# Patient Record
Sex: FEMALE | Race: OTHER
[De-identification: names, ages, dates, MRNs, and addresses within clinical notes are randomized per-mention and may not be internally consistent; named-entity substitution may affect disease eponyms.]

---

## 2017-07-11 ENCOUNTER — HOSPITAL ENCOUNTER (EMERGENCY)
Dept: HOSPITAL 62 - ER | Age: 42
Discharge: HOME | End: 2017-07-11
Payer: COMMERCIAL

## 2017-07-11 VITALS — DIASTOLIC BLOOD PRESSURE: 79 MMHG | SYSTOLIC BLOOD PRESSURE: 136 MMHG
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
## 2017-07-11 DIAGNOSIS — R11.0: ICD-10-CM

## 2017-07-11 DIAGNOSIS — R42: ICD-10-CM

## 2017-07-11 DIAGNOSIS — S09.90XA: Primary | ICD-10-CM

## 2017-07-11 DIAGNOSIS — M62.838: ICD-10-CM

## 2017-07-11 DIAGNOSIS — S43.402A: ICD-10-CM
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
## 2017-07-11 PROCEDURE — 73030 X-RAY EXAM OF SHOULDER: CPT

## 2017-07-11 PROCEDURE — S0119 ONDANSETRON 4 MG: HCPCS

## 2017-07-11 PROCEDURE — 99284 EMERGENCY DEPT VISIT MOD MDM: CPT

## 2017-07-11 PROCEDURE — 70450 CT HEAD/BRAIN W/O DYE: CPT

## 2017-07-11 NOTE — RADIOLOGY REPORT (SQ)
EXAM DESCRIPTION:  CT HEAD WITHOUT



COMPLETED DATE/TIME:  7/11/2017 3:41 pm



REASON FOR STUDY:  fall, head injury



COMPARISON:  None.



TECHNIQUE:  Axial images acquired through the brain without intravenous contrast.  Images reviewed wi
th bone, brain and subdural windows.  Images stored on PACS.

All CT scanners at this facility use dose modulation, iterative reconstruction, and/or weight based d
osing when appropriate to reduce radiation dose to as low as reasonably achievable (ALARA).

[facility]: Dose Right  [facility]: CareDose    [facility]: Dose Right    [facility]: Teradose 4D    [facility]: Smart PrivacyStar



RADIATION DOSE:   mGy.



LIMITATIONS:  None.



FINDINGS:  VENTRICLES: Normal size and contour.

CEREBRUM: No masses.  No hemorrhage.  No midline shift.  Normal gray/white matter differentiation.  N
o evidence for acute infarction.

CEREBELLUM: No masses.  No hemorrhage.  No alteration of density.  No evidence for acute infarction.

EXTRAAXIAL SPACES: No fluid collections.  No masses.

ORBITS AND GLOBE: No intra- or extraconal masses.  Normal contour of globe without masses.

CALVARIUM: No fracture.

PARANASAL SINUSES: No fluid or mucosal thickening.

SOFT TISSUES: No mass or hematoma.

OTHER: No other significant finding.



IMPRESSION:  NORMAL BRAIN CT WITHOUT CONTRAST.



TECHNICAL DOCUMENTATION:  JOB ID:  [id number]

Quality ID # [id number]: Final reports with documentation of one or more dose reduction techniques (e.g., Au
tomated exposure control, adjustment of the mA and/or kV according to patient size, use of iterative 
reconstruction technique)

 2011 [employer]- All Rights Reserved

## 2017-07-11 NOTE — RADIOLOGY REPORT (SQ)
EXAM DESCRIPTION:  SHOULDER LEFT 2 OR MORE VIEWS



COMPLETED DATE/TIME:  7/11/2017 4:05 pm



REASON FOR STUDY:  fall, shoulder pain



COMPARISON:  None.



NUMBER OF VIEWS:  Three views.



TECHNIQUE:  Internal rotation, external rotation, and Y view images acquired of the left shoulder.



LIMITATIONS:  None.



FINDINGS:  MINERALIZATION: Normal.

BONES: No acute fracture or dislocation.  No worrisome bone lesions.

JOINTS: No dislocation.

VISUALIZED LUNGS AND RIBS: No pneumothorax.  No rib fracture.

SOFT TISSUES: No radiopaque foreign body.

OTHER: No other significant finding.



IMPRESSION:  NEGATIVE STUDY OF THE LEFT SHOULDER. NO RADIOGRAPHIC EVIDENCE OF ACUTE INJURY.



TECHNICAL DOCUMENTATION:  JOB ID:  [id number]

 2011 [employer]- All Rights Reserved

## 2017-07-11 NOTE — ER DOCUMENT REPORT
HPI





- HPI


Patient complains to provider of: fall


Onset: This afternoon


Onset/Duration: Sudden


Quality of pain: Sharp


Pain Level: 5


Context: 


States that she was cleaning toddler playground equipment.  Patient states that 

she fell on a toddler climbing apparatus that was 2 feet tall and fell landing 

hitting her left shoulder.  Patient complains of headache, left lateral neck 

pain, left shoulder pain and nausea.  Patient states nausea has gradually 

started to improve.  Patient also reports she initially had dizziness that has 

gotten better.  Patient denies any loss of consciousness.


Associated Symptoms: Headache, Nausea, Other - shoulder pain.  denies: Vomiting


Exacerbated by: Movement


Relieved by: Denies


Similar symptoms previously: No


Recently seen / treated by doctor: No





- ROS


ROS below otherwise negative: Yes


Systems Reviewed and Negative: Yes All other systems reviewed and negative





- NEURO


Neurology: REPORTS: Headache, Dizzinesss / Vertigo.  DENIES: Weakness





- CARDIOVASCULAR


Cardiovascular: DENIES: Chest pain





- GASTROINTESTINAL


Gastrointestinal: REPORTS: Nausea.  DENIES: Patient vomiting





- MUSCULOSKELETAL


Musculoskeletal: REPORTS: Extremity pain, Back Pain, Neck Pain





- DERM


Skin Color: Normal, Pink


Skin Problems: None





Past Medical History





- General


Information source: Patient





- Social History


Smoking Status: Never Smoker


Chew tobacco use (# tins/day): No


Frequency of alcohol use: None


Drug Abuse: None


Occupation: day care


Family History: Reviewed & Not Pertinent


Patient has suicidal ideation: No


Patient has homicidal ideation: No





- Medical History


Medical History: Negative





- Past Medical History


Cardiac Medical History: 


   Denies: Hx Heart Attack, Hx Hypertension


Pulmonary Medical History: 


   Denies: Hx Asthma


Neurological Medical History: Denies: Hx Cerebrovascular Accident, Hx Seizures


Renal/ Medical History: Denies: Hx Peritoneal Dialysis


GI Medical History: Denies: Hx Hepatitis, Hx Hiatal Hernia, Hx Ulcer


Infectious Medical History: Denies: Hx Hepatitis


Surgical Hx: Negative


Past Surgical History: Denies: Hx Hysterectomy, Hx Mastectomy, Hx Open Heart 

Surgery, Hx Pacemaker





Vertical Provider Document





- CONSTITUTIONAL


Agree With Documented VS: Yes


Exam Limitations: No Limitations


General Appearance: WD/WN, No Apparent Distress





- INFECTION CONTROL


TRAVEL OUTSIDE OF THE [place] IN LAST 30 DAYS: No





- HEENT


HEENT: Atraumatic, Normal ENT Exam, Normocephalic, PERRLA


Notes: 


no hemotympanum, no fluid or drainage from ears or nose bilaterally. no scalp 

hematoma, no ecchymosis or swelling





- NECK


Neck: Normal Inspection, Supple.  negative: Lymphadenopathy-Left, 

Lymphadenopathy-Right


Notes: 


no midline tenderness, step-off or deformity





- RESPIRATORY


Respiratory: Breath Sounds Normal, No Respiratory Distress, Chest Non-Tender


O2 Sat by Pulse Oximetry: 100





- CARDIOVASCULAR


Cardiovascular: Regular Rate, Regular Rhythm, No Murmur


Pulses: Normal: Radial





- BACK


Back: Abnormal Inspection - Trapezius muscle tenderness


Notes: 


no spinal midline tenderness, step-off or deformity





- MUSCULOSKELETAL/EXTREMETIES


Musculoskeletal/Extremeties: MAEW, FROM, Tender - Left posterior shoulder joint 

tenderness, no deformity or dislocation, No Edema





- NEURO


Level of Consciousness: Awake, Alert, Appropriate


Motor/Sensory: No Motor Deficit





- DERM


Integumentary: Warm, Dry, No Rash





Course





- Vital Signs


Vital signs: 


 











Temp Pulse Resp BP Pulse Ox


 


 98.2 F   58 L  16   127/97 H  100 


 


 07/11/17 14:01  07/11/17 14:01  07/11/17 14:01  07/11/17 14:01  07/11/17 14:01














- Diagnostic Test


Radiology reviewed: Reports reviewed





Procedures





- Immobilization


  ** Left Shoulder


Pre-Proc Neuro Vasc Exam: Normal


Immobilizer type: Sling


Performed by: PCT


Post-Proc Neuro Vasc Exam: Normal


Alignment checked and good: Yes





Discharge





- Discharge


Clinical Impression: 


 Trapezius muscle spasm





Fall


Qualifiers:


 Encounter type: initial encounter Qualified Code(s): W19.XXXA - Unspecified 

fall, initial encounter





Head injury


Qualifiers:


 Encounter type: initial encounter Qualified Code(s): S09.90XA - Unspecified 

injury of head, initial encounter





Sprain of left shoulder


Qualifiers:


 Encounter type: initial encounter Shoulder sprain type: unspecified sprain 

Qualified Code(s): S43.402A - Unspecified sprain of left shoulder joint, 

initial encounter





Condition: Stable


Disposition: HOME, SELF-CARE


Instructions:  Head Injury Precautions ([facility]), Muscle Strain ([facility]), Muscle 

Relaxers ([facility]), Shoulder Injury ([facility]), Temporary Sling ([facility])


Additional Instructions: 


Return immediately for any new or worsening symptoms





Followup with your primary care provider, call tomorrow to make a followup 

appointment





Follow-up with orthopedic doctor for any continued pain or problems


Prescriptions: 


Cyclobenzaprine HCl [Flexeril 10 Mg Tablet] 10 mg PO TID #15 tablet


Forms:  Return to Work


Referrals: 


BEVERLY STANFORD PA-C [Primary Care Provider] - Follow up tomorrow


[facility] [Provider Group] - Follow up as needed

## 2018-12-31 ENCOUNTER — HOSPITAL ENCOUNTER (OUTPATIENT)
Dept: HOSPITAL 62 - ER | Age: 43
Setting detail: OBSERVATION
LOS: 1 days | Discharge: HOME | End: 2019-01-01
Attending: OBSTETRICS & GYNECOLOGY | Admitting: OBSTETRICS & GYNECOLOGY
Payer: COMMERCIAL

## 2018-12-31 DIAGNOSIS — K65.9: ICD-10-CM

## 2018-12-31 DIAGNOSIS — O00.90: Primary | ICD-10-CM

## 2018-12-31 DIAGNOSIS — Z98.51: ICD-10-CM

## 2018-12-31 DIAGNOSIS — K66.1: ICD-10-CM

## 2018-12-31 DIAGNOSIS — N70.11: ICD-10-CM

## 2018-12-31 DIAGNOSIS — N83.201: ICD-10-CM

## 2018-12-31 LAB
ABSOLUTE LYMPHOCYTES# (MANUAL): 0.2 10^3/UL (ref 0.5–4.7)
ABSOLUTE MONOCYTES # (MANUAL): 1.7 10^3/UL (ref 0.1–1.4)
ABSOLUTE NEUTROPHILS# (MANUAL): 21.8 10^3/UL (ref 1.7–8.2)
ADD MANUAL DIFF: YES
ANION GAP SERPL CALC-SCNC: 12 MMOL/L (ref 5–19)
APPEARANCE UR: (no result)
APTT PPP: (no result) S
BACTERIA (WET MOUNT): (no result)
BASOPHILS NFR BLD MANUAL: 0 % (ref 0–2)
BILIRUB UR QL STRIP: (no result)
BUN SERPL-MCNC: 10 MG/DL (ref 7–20)
CALCIUM: 9.4 MG/DL (ref 8.4–10.2)
CHLAM PCR: NOT DETECTED
CHLORIDE SERPL-SCNC: 100 MMOL/L (ref 98–107)
CO2 SERPL-SCNC: 25 MMOL/L (ref 22–30)
EOSINOPHIL NFR BLD MANUAL: 0 % (ref 0–6)
ERYTHROCYTE [DISTWIDTH] IN BLOOD BY AUTOMATED COUNT: 12.3 % (ref 11.5–14)
GLUCOSE SERPL-MCNC: 170 MG/DL (ref 75–110)
GLUCOSE UR STRIP-MCNC: 50 MG/DL
GON PCR: NOT DETECTED
HCT VFR BLD CALC: 38.2 % (ref 36–47)
HGB BLD-MCNC: 13.1 G/DL (ref 12–15.5)
KETONES UR STRIP-MCNC: (no result) MG/DL
MCH RBC QN AUTO: 29.6 PG (ref 27–33.4)
MCHC RBC AUTO-ENTMCNC: 34.3 G/DL (ref 32–36)
MCV RBC AUTO: 86 FL (ref 80–97)
MONOCYTES % (MANUAL): 7 % (ref 3–13)
NITRITE UR QL STRIP: NEGATIVE
PH UR STRIP: 5 [PH] (ref 5–9)
PLATELET # BLD: 328 10^3/UL (ref 150–450)
PLATELET COMMENT: ADEQUATE
POLYCHROMASIA BLD QL SMEAR: SLIGHT
POTASSIUM SERPL-SCNC: 3.8 MMOL/L (ref 3.6–5)
PROT UR STRIP-MCNC: 30 MG/DL
RBC # BLD AUTO: 4.42 10^6/UL (ref 3.72–5.28)
SEGMENTED NEUTROPHILS % (MAN): 92 % (ref 42–78)
SODIUM SERPL-SCNC: 136.5 MMOL/L (ref 137–145)
SP GR UR STRIP: 1.03
T.VAGINALIS (WET MOUNT): (no result)
TOTAL CELLS COUNTED BLD: 100
TOXIC GRANULES BLD QL SMEAR: SLIGHT
UROBILINOGEN UR-MCNC: 4 MG/DL (ref ?–2)
VARIANT LYMPHS NFR BLD MANUAL: 1 % (ref 13–45)
WBC # BLD AUTO: 23.7 10^3/UL (ref 4–10.5)
WBCS (WET MOUNT): (no result)
YEAST (WET MOUNT): (no result)

## 2018-12-31 PROCEDURE — 93976 VASCULAR STUDY: CPT

## 2018-12-31 PROCEDURE — 59151 TREAT ECTOPIC PREGNANCY: CPT

## 2018-12-31 PROCEDURE — 10T24ZZ RESECTION OF PRODUCTS OF CONCEPTION, ECTOPIC, PERCUTANEOUS ENDOSCOPIC APPROACH: ICD-10-PCS | Performed by: OBSTETRICS & GYNECOLOGY

## 2018-12-31 PROCEDURE — 76817 TRANSVAGINAL US OBSTETRIC: CPT

## 2018-12-31 PROCEDURE — 85025 COMPLETE CBC W/AUTO DIFF WBC: CPT

## 2018-12-31 PROCEDURE — 81001 URINALYSIS AUTO W/SCOPE: CPT

## 2018-12-31 PROCEDURE — G0378 HOSPITAL OBSERVATION PER HR: HCPCS

## 2018-12-31 PROCEDURE — 87591 N.GONORRHOEAE DNA AMP PROB: CPT

## 2018-12-31 PROCEDURE — 87210 SMEAR WET MOUNT SALINE/INK: CPT

## 2018-12-31 PROCEDURE — 88305 TISSUE EXAM BY PATHOLOGIST: CPT

## 2018-12-31 PROCEDURE — 83036 HEMOGLOBIN GLYCOSYLATED A1C: CPT

## 2018-12-31 PROCEDURE — 99291 CRITICAL CARE FIRST HOUR: CPT

## 2018-12-31 PROCEDURE — 96374 THER/PROPH/DIAG INJ IV PUSH: CPT

## 2018-12-31 PROCEDURE — 86901 BLOOD TYPING SEROLOGIC RH(D): CPT

## 2018-12-31 PROCEDURE — 84702 CHORIONIC GONADOTROPIN TEST: CPT

## 2018-12-31 PROCEDURE — 94799 UNLISTED PULMONARY SVC/PX: CPT

## 2018-12-31 PROCEDURE — 80048 BASIC METABOLIC PNL TOTAL CA: CPT

## 2018-12-31 PROCEDURE — 36415 COLL VENOUS BLD VENIPUNCTURE: CPT

## 2018-12-31 PROCEDURE — 96375 TX/PRO/DX INJ NEW DRUG ADDON: CPT

## 2018-12-31 PROCEDURE — 87491 CHLMYD TRACH DNA AMP PROBE: CPT

## 2018-12-31 PROCEDURE — 86900 BLOOD TYPING SEROLOGIC ABO: CPT

## 2018-12-31 PROCEDURE — 0UT74ZZ RESECTION OF BILATERAL FALLOPIAN TUBES, PERCUTANEOUS ENDOSCOPIC APPROACH: ICD-10-PCS | Performed by: OBSTETRICS & GYNECOLOGY

## 2018-12-31 PROCEDURE — 86850 RBC ANTIBODY SCREEN: CPT

## 2018-12-31 RX ADMIN — CEFAZOLIN SODIUM PRN MLS: 2 SOLUTION INTRAVENOUS at 23:20

## 2018-12-31 RX ADMIN — CEFAZOLIN SODIUM PRN MLS/HR: 2 SOLUTION INTRAVENOUS at 22:54

## 2018-12-31 NOTE — ER DOCUMENT REPORT
ED Medical Screen (RME)





- General


Chief Complaint: Abdominal Pain


Stated Complaint: ABDOMINAL PAIN


Time Seen by Provider: 12/31/18 18:20


TRAVEL OUTSIDE OF THE [place] IN LAST 30 DAYS: No





- HPI


Notes: 





12/31/18 18:25


Patient is  a 43-year-old female that presents to the emergency department for 

chief complaint of left adnexal pain.  Patient has history of tubal ligation in 

2013.  She recently found out she was pregnant.  She has been having pain in her

left adnexa for the last few days.  Her last normal menstrual cycle was the 

second week of November.





ROS:





GENERAL: Denies fever of chills





CV: Denies chest pain 








PHYSICAL EXAMINATION:





GENERAL: Well-appearing, well-nourished and in no acute distress.





HEAD: Atraumatic, normocephalic.





EYES: Pupils equal round extraocular movements intact,  conjunctiva are normal.





ENT: Nares patent





NECK: Normal range of motion





LUNGS: No respiratory distress





Musculoskeletal: Normal range of motion





NEUROLOGICAL:  Normal speech, normal gait.





PSYCH: Normal mood, normal affect.








MDM:





Patient seen and examined for rapid initial assessment. Vital signs reviewed.  A

comprehensive ED assessment and evaluation of the patient, analysis of test 

results and completion of the medical decision making process will be conducted 

by additional ED providers.











- Related Data


Allergies/Adverse Reactions: 


                                        





aspirin [Aspirin] Allergy (Verified 08/12/13 11:31)


   HIVES/FRANCISCOLL











Past Medical History





- Past Medical History


Cardiac Medical History: 


   Denies: Hx Heart Attack, Hx Hypertension


Pulmonary Medical History: 


   Denies: Hx Asthma


Neurological Medical History: Denies: Hx Cerebrovascular Accident, Hx Seizures


Renal/ Medical History: Denies: Hx Peritoneal Dialysis


GI Medical History: Denies: Hx Hepatitis, Hx Hiatal Hernia, Hx Ulcer


Infectious Medical History: Denies: Hx Hepatitis


Past Surgical History: Denies: Hx Hysterectomy, Hx Mastectomy, Hx Open Heart 

Surgery, Hx Pacemaker





Physical Exam





- Vital signs


Vitals: 





                                        











Temp Pulse Resp BP Pulse Ox


 


 97.9 F   81   18   106/71   100 


 


 12/31/18 17:22  12/31/18 17:22  12/31/18 17:22  12/31/18 17:22  12/31/18 17:22














Course





- Vital Signs


Vital signs: 





                                        











Temp Pulse Resp BP Pulse Ox


 


 97.9 F   81   18   106/71   100 


 


 12/31/18 17:22  12/31/18 17:22  12/31/18 17:22  12/31/18 17:22  12/31/18 17:22














Doctor's Discharge





- Discharge


Referrals: 


BEVERLY STANFORD PA-C [Primary Care Provider] - Follow up as needed

## 2018-12-31 NOTE — BRIEF OPERATIVE NOTE
BRIEF OPERATIVE REPORT


DATE OF SURGERY: 12/31/18


TIME OF SURGERY: 23:30


PREOPERATIVE DIAGNOSIS: Abdominal pain in pregnancy, history of bilateral tubal 

occlusion, Ectopic pregnancy


POSTOPERATIVE DIAGNOSIS: MARIA R


SURGEON: CAYDEN WHITE


FINDINGS: 900ml of hemoperitoneum, left fallopian tube not congruent which is 

consistent with prior fulguration for sterilization but scarring noted to ovary 

and uterus, right hemorrhagic ovarian cyst, Right fallopian tube tortuous and 

appeared congruent but dilated with hemorrhage and ectopic extending from 

ampullary portion to cornua


COMPLICATIONS: 


none


ESTIMATED BLOOD LOSS: less than 5ml for operative EBL, 900ml hemoperitoneum


TISSUE REMOVED OR ALTERED: bilateral fallopian tubes, ectopic pregnancy


TECHNICAL PROCEDURE: Operative Laparoscopy, Evacuation of hemoperitoneum, Lysis 

of adhesions, Right ovarian cystotomy, Bilateral salpingectomy and removal of 

ectopic pregnancy

## 2018-12-31 NOTE — RADIOLOGY REPORT (SQ)
EXAM DESCRIPTION:Transvaginal ultrasound



CLINICAL HISTORY:43 years Female, pelvic pain in preg



COMPARISON:None.



TECHNIQUE: 

Grayscale and Doppler sonogram of the pelvis. Transvaginal

technique was used for better evaluation of the pelvic viscera



FINDINGS:



The uterus measures 9.2 x 4.6 x 6.4 cm. No gestational sac is

visualized.



Endometrial stripe: 14 mm in thickness



Right ovary: Not visualized.



Left ovary: Not visualized.



Impression:

No intrauterine pregnancy is identified which would be expected

with a beta-hCG value of almost 10,000. There is heterogeneous

material throughout the pelvis which could represent complex

fluid/blood or soft tissues. The ovaries are not visualized.

## 2019-01-01 VITALS — DIASTOLIC BLOOD PRESSURE: 54 MMHG | SYSTOLIC BLOOD PRESSURE: 98 MMHG

## 2019-01-01 LAB
ABSOLUTE LYMPHOCYTES# (MANUAL): 0.2 10^3/UL (ref 0.5–4.7)
ABSOLUTE MONOCYTES # (MANUAL): 0.4 10^3/UL (ref 0.1–1.4)
ABSOLUTE NEUTROPHILS# (MANUAL): 21.5 10^3/UL (ref 1.7–8.2)
ADD MANUAL DIFF: YES
BASOPHILS NFR BLD MANUAL: 0 % (ref 0–2)
BURR CELLS BLD QL SMEAR: SLIGHT
DACRYOCYTES BLD QL SMEAR: SLIGHT
EOSINOPHIL NFR BLD MANUAL: 0 % (ref 0–6)
ERYTHROCYTE [DISTWIDTH] IN BLOOD BY AUTOMATED COUNT: 12.4 % (ref 11.5–14)
HCT VFR BLD CALC: 28.5 % (ref 36–47)
HGB BLD-MCNC: 9.7 G/DL (ref 12–15.5)
MCH RBC QN AUTO: 29.6 PG (ref 27–33.4)
MCHC RBC AUTO-ENTMCNC: 34.2 G/DL (ref 32–36)
MCV RBC AUTO: 87 FL (ref 80–97)
MONOCYTES % (MANUAL): 2 % (ref 3–13)
PLATELET # BLD: 220 10^3/UL (ref 150–450)
PLATELET COMMENT: ADEQUATE
POIKILOCYTOSIS BLD QL SMEAR: SLIGHT
RBC # BLD AUTO: 3.29 10^6/UL (ref 3.72–5.28)
SEGMENTED NEUTROPHILS % (MAN): 97 % (ref 42–78)
TOTAL CELLS COUNTED BLD: 100
TOXIC GRANULES BLD QL SMEAR: SLIGHT
VARIANT LYMPHS NFR BLD MANUAL: 1 % (ref 13–45)
WBC # BLD AUTO: 22.2 10^3/UL (ref 4–10.5)

## 2019-01-01 RX ADMIN — HYDROCODONE BITARTRATE AND ACETAMINOPHEN PRN TAB: 5; 325 TABLET ORAL at 04:46

## 2019-01-01 RX ADMIN — HYDROCODONE BITARTRATE AND ACETAMINOPHEN PRN TAB: 5; 325 TABLET ORAL at 09:23

## 2019-01-01 NOTE — OPERATIVE REPORT
Operative Report


DATE OF SURGERY: 18


PREOPERATIVE DIAGNOSIS: Abdominal pain in pregnancy, history of bilateral tubal 

occlusion, Ectopic pregnancy


POSTOPERATIVE DIAGNOSIS: MARIA R


OPERATION: Operative Laparoscopy, Evacuation of hemoperitoneum, Lysis of 

adhesions, Right ovarian cystotomy, Bilateral salpingectomy and removal of 

ectopic pregnancy


SURGEON: CAYDEN WHITE


ANESTHESIA: GA


TISSUE REMOVED OR ALTERED: bilateral fallopian tubes, ectopic pregnancy


COMPLICATIONS: 


none


ESTIMATED BLOOD LOSS: less than 5ml for operative EBL, 900ml hemoperitoneum


INTRAOPERATIVE FINDINGS: 900ml of hemoperitoneum, left fallopian tube not 

congruent which is consistent with prior fulguration for sterilization but 

scarring noted to ovary and uterus, right hemorrhagic ovarian cyst, Right 

fallopian tube tortuous and appeared congruent but dilated with hemorrhage and 

ectopic extending from ampullary portion to cornua


PROCEDURE: 


Anesthesiologist: Lito BUNDY, Rachelle Leblanc CRNA





IV fluids:  [1000ml]





Urine output: [200ml]





Indications: [42yo  at approximately 6-7wks pregnant with unsure LMP 

presents to the ER with 4 day history of  bilateral adnexal pain and pain 

extending to upper abdomen.  History significant for Bilateral Tubal ligation 

with fulguration in .  She went to urgent care today for abdominal pain and 

found out she was pregnant and was sent to [facility] ER.  [phone].  Reviewed with 

patient US findings concern for ruptured ectopic and need for surgical 

intervention.  The risks, benefits, alternatives of surgical intervention were 

reviewed and she desire to proceed with planned procedure.]





Procedure: The patient was taken to the operating room where general anesthesia 

was obtained without difficulty.  The patient was then examined under anesthesia

with findings as noted above with a small anteverted uterus.  She was then 

placed in dorsal supine lithotomy position and prepped and draped in the normal 

sterile fashion.  [place] speculum was then placed in the patient's vagina and 

the anterior lip of the cervix grasped with a single-tooth tenaculum.  A [employer] 

uterine manipulator was then advanced into the uterus to provide a means of 

manipulation of the uterus.  The speculum and tenaculum were then removed from 

the patient's cervix and vagina.  Attention was then turned to the patient's 

abdomen where a 5 mm infraumbilical skin incision was then made.  The Optiview 

trocar with 0 laparoscope was then advanced without difficulty under direct 

visualization with the Optiview trocar.  This was performed while tenting the 

abdominal wall and these will fashion.  Intraperitoneal placement was confirmed 

by the direct visualization.  Pneumoperitoneum was then obtained with 

approximately 4 L carbon dioxide gas.  Survey of the patient's abdomen and 

pelvis revealed findings as noted above.  A second skin incision was then made 

approximately 3 cm superior 4 cm medial to the anterior superior iliac spine on 

the left and then a third skin incision was made approximately 3 cm superior to 

the lower incision.  These incisions were made under direct visualization with 

the laparoscope.  The second and third trochars were then advanced under direct 

visualization of the laparoscope at the sites.  Hemoperitoneum evacuated with 

suction .  The right fallopian tube was then identified and followed 

out to the fimbriated end and noted to be very tortuous and dilated with ectopic

pregnancy from ampullary portion to very near the cornu.  The LigaSure device 

was used to clamp and cauterize and cut the mesosalpinx extending from the 

fimbriated end to the cornua of the uterus thus removing the right fallopian 

tube in its entirety with ectopic pregnancy.  The right ovary was noted to be 

normal and vasculature remained intact to this ovary.  The right cornu was 

hemostatic.  Attention was then turned to the left adnexa at which time the left

fallopian tube was identified and notd to be incongruent but with scarring to 

ovary and uterus. LigaSure device was then used to clamp and cauterize and cut 

the mesosalpinx extending from the fimbriated end of the left fallopian tube to 

the uterine cornua thus removing the left fallopian tube in its entirety. The 

left and right fallopian tubes were removed easily through the endobag.  All 

operative sites were visualized and noted to be hemostatic.  The 2 additional 

trochars on the patient's left greater than removed under direct visualization. 

The 10 mm trocar was then removed after abdominal insufflation was removed.  The

fascia at the 10 mm trocar site was closed with 0 Vicryl on a UR 6 needle.  The 

skin at all trocar sites were closed with 3-0 Monocryl in a subcuticular fashion

with overlying Dermabond.


No antibiotics were indicated for this procedure.  After completion of skin 

closure of the trocar sites attention was then turned to the vagina where the 

[employer] uterine manipulator was removed and the bivalve speculum was replaced.  

Silver nitrate was applied to the tenaculum sites for hemostasis and the 

speculum was removed.  Sponge lap needle and instrument counts were correct 3. 

The patient tolerated the procedure well and was taken to the recovery area 

awake and in stable condition.

## 2019-01-01 NOTE — PDOC DISCHARGE SUMMARY
General





- Admit/Disc Date/PCP


Admission Date/Primary Care Provider: 


  12/31/18 22:08





  





Discharge Date: 01/01/19





- Discharge Diagnosis


(1) Ectopic pregnancy without intrauterine pregnancy


Is this a current diagnosis for this admission?: Yes   





(2) History of female sterilization


Is this a current diagnosis for this admission?: Yes   





(3) Peritonitis


Is this a current diagnosis for this admission?: Yes   





- Additional Information


Resuscitation Status: Full Code





History of Present Illness


History of Present Illness: 


ROZ CROWDER is a 43 year old female








Hospital Course


Hospital Course: 





underwent b/l salpingectomy with removal of ectopic pregnancy and evacuation of 

hematoma.  doing well. 





Physical Exam





- Physical Exam


Vital Signs: 


                                        











Temp Pulse Resp BP Pulse Ox


 


 98 F   89   16   98/54 L  99 


 


 01/01/19 08:50  01/01/19 08:50  01/01/19 08:50  01/01/19 08:50  01/01/19 08:50








                                 Intake & Output











 12/31/18 01/01/19 01/02/19





 06:59 06:59 06:59


 


Intake Total 1100  


 


Output Total 805 200 


 


Balance 295 -200 


 


Weight  70.7 kg 











General appearance: PRESENT: no acute distress, cooperative


GI/Abdominal exam: PRESENT: soft, tenderness - appropriate for post op period





Result


Laboratory Results: 


                                        





                                 01/01/19 04:54 





                                 12/31/18 18:30 





                                        











  12/31/18 12/31/18 12/31/18





  18:30 18:30 19:18


 


WBC  23.7 H  


 


RBC  4.42  


 


Hgb  13.1  


 


Hct  38.2  


 


MCV  86  


 


MCH  29.6  


 


MCHC  34.3  


 


RDW  12.3  


 


Plt Count  328  


 


Seg Neutrophils %  Not Reportable  


 


Lymphocytes %  Not Reportable  


 


Monocytes %  Not Reportable  


 


Eosinophils %  Not Reportable  


 


Basophils %  Not Reportable  


 


Absolute Neutrophils  Not Reportable  


 


Absolute Lymphocytes  Not Reportable  


 


Absolute Monocytes  Not Reportable  


 


Absolute Eosinophils  Not Reportable  


 


Absolute Basophils  Not Reportable  


 


Sodium   136.5 L 


 


Potassium   3.8 


 


Chloride   100 


 


Carbon Dioxide   25 


 


Anion Gap   12 


 


BUN   10 


 


Creatinine   0.78 


 


Est GFR ( Amer)   > 60 


 


Est GFR (Non-Af Amer)   > 60 


 


Glucose   170 H 


 


Calcium   9.4 


 


Urine Color    KADE


 


Urine Appearance    CLOUDY


 


Urine pH    5.0


 


Ur Specific Gravity    1.033


 


Urine Protein    30 H


 


Urine Glucose (UA)    50 H


 


Urine Ketones    TRACE H


 


Urine Blood    NEGATIVE


 


Urine Nitrite    NEGATIVE


 


Ur Leukocyte Esterase    SMALL H


 


Urine WBC (Auto)    5


 


Urine RBC (Auto)    12


 


Blood Type   


 


Antibody Screen   














  12/31/18 01/01/19





  23:20 04:54


 


WBC   22.2 H


 


RBC   3.29 L


 


Hgb   9.7 L D


 


Hct   28.5 L


 


MCV   87


 


MCH   29.6


 


MCHC   34.2


 


RDW   12.4


 


Plt Count   220


 


Seg Neutrophils %   Not Reportable


 


Lymphocytes %   Not Reportable


 


Monocytes %   Not Reportable


 


Eosinophils %   Not Reportable


 


Basophils %   Not Reportable


 


Absolute Neutrophils   Not Reportable


 


Absolute Lymphocytes   Not Reportable


 


Absolute Monocytes   Not Reportable


 


Absolute Eosinophils   Not Reportable


 


Absolute Basophils   Not Reportable


 


Sodium  


 


Potassium  


 


Chloride  


 


Carbon Dioxide  


 


Anion Gap  


 


BUN  


 


Creatinine  


 


Est GFR ( Amer)  


 


Est GFR (Non-Af Amer)  


 


Glucose  


 


Calcium  


 


Urine Color  


 


Urine Appearance  


 


Urine pH  


 


Ur Specific Gravity  


 


Urine Protein  


 


Urine Glucose (UA)  


 


Urine Ketones  


 


Urine Blood  


 


Urine Nitrite  


 


Ur Leukocyte Esterase  


 


Urine WBC (Auto)  


 


Urine RBC (Auto)  


 


Blood Type  O POSITIVE 


 


Antibody Screen  NEGATIVE 














Plan


Discharge Plan: 





discharge home with keflex and pain medication.  needs to f/u with Dr. Sher 

on Monday


Time Spent: Less than 30 Minutes